# Patient Record
Sex: MALE | Race: WHITE | NOT HISPANIC OR LATINO | Employment: FULL TIME | URBAN - METROPOLITAN AREA
[De-identification: names, ages, dates, MRNs, and addresses within clinical notes are randomized per-mention and may not be internally consistent; named-entity substitution may affect disease eponyms.]

---

## 2024-04-09 ENCOUNTER — APPOINTMENT (OUTPATIENT)
Dept: RADIOLOGY | Facility: HOSPITAL | Age: 34
End: 2024-04-09
Payer: COMMERCIAL

## 2024-04-09 ENCOUNTER — HOSPITAL ENCOUNTER (EMERGENCY)
Facility: HOSPITAL | Age: 34
Discharge: HOME | End: 2024-04-09
Attending: EMERGENCY MEDICINE
Payer: COMMERCIAL

## 2024-04-09 ENCOUNTER — APPOINTMENT (OUTPATIENT)
Dept: CARDIOLOGY | Facility: HOSPITAL | Age: 34
End: 2024-04-09
Payer: COMMERCIAL

## 2024-04-09 VITALS
DIASTOLIC BLOOD PRESSURE: 71 MMHG | SYSTOLIC BLOOD PRESSURE: 138 MMHG | OXYGEN SATURATION: 98 % | TEMPERATURE: 97.5 F | WEIGHT: 300 LBS | RESPIRATION RATE: 20 BRPM | HEART RATE: 101 BPM

## 2024-04-09 DIAGNOSIS — R00.2 PALPITATIONS: Primary | ICD-10-CM

## 2024-04-09 LAB
ALBUMIN SERPL BCP-MCNC: 4.5 G/DL (ref 3.4–5)
ALP SERPL-CCNC: 95 U/L (ref 33–120)
ALT SERPL W P-5'-P-CCNC: 67 U/L (ref 10–52)
ANION GAP SERPL CALC-SCNC: 18 MMOL/L (ref 10–20)
AST SERPL W P-5'-P-CCNC: 26 U/L (ref 9–39)
BASOPHILS # BLD AUTO: 0.04 X10*3/UL (ref 0–0.1)
BASOPHILS NFR BLD AUTO: 0.5 %
BILIRUB SERPL-MCNC: 0.4 MG/DL (ref 0–1.2)
BUN SERPL-MCNC: 6 MG/DL (ref 6–23)
CALCIUM SERPL-MCNC: 9.2 MG/DL (ref 8.6–10.3)
CARDIAC TROPONIN I PNL SERPL HS: 3 NG/L (ref 0–20)
CARDIAC TROPONIN I PNL SERPL HS: 3 NG/L (ref 0–20)
CHLORIDE SERPL-SCNC: 101 MMOL/L (ref 98–107)
CO2 SERPL-SCNC: 22 MMOL/L (ref 21–32)
CREAT SERPL-MCNC: 1.03 MG/DL (ref 0.5–1.3)
EGFRCR SERPLBLD CKD-EPI 2021: >90 ML/MIN/1.73M*2
EOSINOPHIL # BLD AUTO: 0.06 X10*3/UL (ref 0–0.7)
EOSINOPHIL NFR BLD AUTO: 0.8 %
ERYTHROCYTE [DISTWIDTH] IN BLOOD BY AUTOMATED COUNT: 12.8 % (ref 11.5–14.5)
FLUAV RNA RESP QL NAA+PROBE: NOT DETECTED
FLUBV RNA RESP QL NAA+PROBE: NOT DETECTED
GLUCOSE SERPL-MCNC: 118 MG/DL (ref 74–99)
HCT VFR BLD AUTO: 45.4 % (ref 41–52)
HGB BLD-MCNC: 15.6 G/DL (ref 13.5–17.5)
IMM GRANULOCYTES # BLD AUTO: 0.03 X10*3/UL (ref 0–0.7)
IMM GRANULOCYTES NFR BLD AUTO: 0.4 % (ref 0–0.9)
LYMPHOCYTES # BLD AUTO: 1.31 X10*3/UL (ref 1.2–4.8)
LYMPHOCYTES NFR BLD AUTO: 17.4 %
MCH RBC QN AUTO: 27.9 PG (ref 26–34)
MCHC RBC AUTO-ENTMCNC: 34.4 G/DL (ref 32–36)
MCV RBC AUTO: 81 FL (ref 80–100)
MONOCYTES # BLD AUTO: 0.52 X10*3/UL (ref 0.1–1)
MONOCYTES NFR BLD AUTO: 6.9 %
NEUTROPHILS # BLD AUTO: 5.59 X10*3/UL (ref 1.2–7.7)
NEUTROPHILS NFR BLD AUTO: 74 %
NRBC BLD-RTO: 0 /100 WBCS (ref 0–0)
PLATELET # BLD AUTO: 418 X10*3/UL (ref 150–450)
POTASSIUM SERPL-SCNC: 4 MMOL/L (ref 3.5–5.3)
PROT SERPL-MCNC: 7.4 G/DL (ref 6.4–8.2)
RBC # BLD AUTO: 5.59 X10*6/UL (ref 4.5–5.9)
SODIUM SERPL-SCNC: 137 MMOL/L (ref 136–145)
WBC # BLD AUTO: 7.6 X10*3/UL (ref 4.4–11.3)

## 2024-04-09 PROCEDURE — 87502 INFLUENZA DNA AMP PROBE: CPT | Performed by: EMERGENCY MEDICINE

## 2024-04-09 PROCEDURE — 99285 EMERGENCY DEPT VISIT HI MDM: CPT | Mod: 25

## 2024-04-09 PROCEDURE — 36415 COLL VENOUS BLD VENIPUNCTURE: CPT | Performed by: EMERGENCY MEDICINE

## 2024-04-09 PROCEDURE — 84484 ASSAY OF TROPONIN QUANT: CPT | Performed by: EMERGENCY MEDICINE

## 2024-04-09 PROCEDURE — 71275 CT ANGIOGRAPHY CHEST: CPT | Performed by: RADIOLOGY

## 2024-04-09 PROCEDURE — 80053 COMPREHEN METABOLIC PANEL: CPT | Performed by: EMERGENCY MEDICINE

## 2024-04-09 PROCEDURE — 2500000004 HC RX 250 GENERAL PHARMACY W/ HCPCS (ALT 636 FOR OP/ED): Performed by: EMERGENCY MEDICINE

## 2024-04-09 PROCEDURE — 2550000001 HC RX 255 CONTRASTS: Performed by: EMERGENCY MEDICINE

## 2024-04-09 PROCEDURE — 85025 COMPLETE CBC W/AUTO DIFF WBC: CPT | Performed by: EMERGENCY MEDICINE

## 2024-04-09 PROCEDURE — 93005 ELECTROCARDIOGRAM TRACING: CPT

## 2024-04-09 PROCEDURE — 71275 CT ANGIOGRAPHY CHEST: CPT

## 2024-04-09 PROCEDURE — 71045 X-RAY EXAM CHEST 1 VIEW: CPT | Performed by: RADIOLOGY

## 2024-04-09 PROCEDURE — 71045 X-RAY EXAM CHEST 1 VIEW: CPT

## 2024-04-09 RX ADMIN — IOHEXOL 75 ML: 350 INJECTION, SOLUTION INTRAVENOUS at 13:56

## 2024-04-09 RX ADMIN — SODIUM CHLORIDE 1000 ML: 9 INJECTION, SOLUTION INTRAVENOUS at 11:22

## 2024-04-09 ASSESSMENT — COLUMBIA-SUICIDE SEVERITY RATING SCALE - C-SSRS
2. HAVE YOU ACTUALLY HAD ANY THOUGHTS OF KILLING YOURSELF?: NO
1. IN THE PAST MONTH, HAVE YOU WISHED YOU WERE DEAD OR WISHED YOU COULD GO TO SLEEP AND NOT WAKE UP?: NO
6. HAVE YOU EVER DONE ANYTHING, STARTED TO DO ANYTHING, OR PREPARED TO DO ANYTHING TO END YOUR LIFE?: NO

## 2024-04-09 NOTE — ED PROVIDER NOTES
HPI   Chief Complaint   Patient presents with    Chest Pain     Patient c/o chest tightness and heart racing since 5am, patient appears very anxious bouncing legs and moving around in chair. No other complaints endorsed at this time       33-year-old male with a history of MARLEY and hypertension as well as smokeless tobacco use presents with palpitations.  They started after he woke up.  Around 5 AM.  He had something similar in October last year no conclusive cause was found.  He is never seen a cardiologist.  Never had a stress test.  No family history of cardiac disease at young age.  No recent travel or immobilization.  No lower extremity pain or swelling.  No actual chest pain, just palpitations.  No exertional component.  No shortness of breath.  No orthopnea.  He does vape.  He states that he did drink a fair amount of alcohol yesterday during the [place] but otherwise no unusual food intake or caffeine intake.  No drug use.                          No data recorded                   Patient History   No past medical history on file.  Past Surgical History:   Procedure Laterality Date    OTHER SURGICAL HISTORY  03/23/2016    Skin Tag Removal     No family history on file.  Social History     Tobacco Use    Smoking status: Not on file    Smokeless tobacco: Not on file   Substance Use Topics    Alcohol use: Not on file    Drug use: Not on file       Physical Exam   ED Triage Vitals   Temperature Heart Rate Respirations BP   04/09/24 1029 04/09/24 1029 04/09/24 1032 04/09/24 1029   36.1 °C (97 °F) (!) 120 (!) 22 124/80      Pulse Ox Temp Source Heart Rate Source Patient Position   04/09/24 1032 04/09/24 1029 04/09/24 1029 04/09/24 1029   95 % Temporal Monitor Sitting      BP Location FiO2 (%)     04/09/24 1029 --     Left arm        Physical Exam  Vitals and nursing note reviewed.   Constitutional:       General: He is not in acute distress.     Appearance: He is well-developed.   HENT:      Head: Normocephalic  and atraumatic.   Eyes:      Conjunctiva/sclera: Conjunctivae normal.   Cardiovascular:      Rate and Rhythm: Normal rate and regular rhythm.      Heart sounds: No murmur heard.  Pulmonary:      Effort: Pulmonary effort is normal. No respiratory distress.      Breath sounds: Normal breath sounds.   Abdominal:      Palpations: Abdomen is soft.      Tenderness: There is no abdominal tenderness.   Musculoskeletal:         General: No swelling.      Cervical back: Neck supple.   Skin:     General: Skin is warm and dry.      Capillary Refill: Capillary refill takes less than 2 seconds.   Neurological:      Mental Status: He is alert.   Psychiatric:         Mood and Affect: Mood normal.         ED Course & MDM   ED Course as of 04/10/24 1156   Tue Apr 09, 2024   1313 Patient is pain-free now.  Troponin negative.  Ordering CT chest to rule out PE.  Flu negative.  Labs within normal limits. [CD]      ED Course User Index  [CD] Mehdi Lai MD         Diagnoses as of 04/10/24 1156   Palpitations       Medical Decision Making  EKG interpreted by me.  Sinus rhythm.  Rate 125.  Sinus tachycardia.  No acute ischemic changes.  Normal QRS duration.  No ST elevation.    CT angiogram chest negative for PE.  2 negative troponins.  Overall very low risk profile.  Extensive discussion with him regarding the importance of close follow-up with cardiology for possible outpatient Holter monitor.  Return precautions explained at length.          Procedure  Procedures     Mehdi Lai MD  04/10/24 1154

## 2024-04-09 NOTE — ED TRIAGE NOTES
Patient c/o chest tightness and heart racing since 5am, patient appears very anxious bouncing legs and moving around in chair. No other complaints endorsed at this time

## 2024-04-10 LAB
ATRIAL RATE: 126 BPM
P AXIS: 35 DEGREES
PR INTERVAL: 136 MS
Q ONSET: 249 MS
QRS COUNT: 20 BEATS
QRS DURATION: 81 MS
QT INTERVAL: 335 MS
QTC CALCULATION(BAZETT): 484 MS
QTC FREDERICIA: 427 MS
R AXIS: 23 DEGREES
T AXIS: 8 DEGREES
T OFFSET: 417 MS
VENTRICULAR RATE: 125 BPM

## 2024-05-02 ENCOUNTER — OFFICE VISIT (OUTPATIENT)
Dept: CARDIOLOGY | Facility: CLINIC | Age: 34
End: 2024-05-02
Payer: COMMERCIAL

## 2024-05-02 ENCOUNTER — HOSPITAL ENCOUNTER (OUTPATIENT)
Dept: CARDIOLOGY | Facility: CLINIC | Age: 34
Discharge: HOME | End: 2024-05-02
Payer: COMMERCIAL

## 2024-05-02 VITALS
SYSTOLIC BLOOD PRESSURE: 110 MMHG | HEART RATE: 86 BPM | HEIGHT: 69 IN | OXYGEN SATURATION: 96 % | BODY MASS INDEX: 44.88 KG/M2 | WEIGHT: 303 LBS | DIASTOLIC BLOOD PRESSURE: 80 MMHG

## 2024-05-02 DIAGNOSIS — R00.2 PALPITATIONS: ICD-10-CM

## 2024-05-02 DIAGNOSIS — R06.09 DYSPNEA ON EXERTION: ICD-10-CM

## 2024-05-02 DIAGNOSIS — G47.33 OSA (OBSTRUCTIVE SLEEP APNEA): ICD-10-CM

## 2024-05-02 DIAGNOSIS — E66.01 MORBID OBESITY (MULTI): ICD-10-CM

## 2024-05-02 DIAGNOSIS — I10 PRIMARY HYPERTENSION: ICD-10-CM

## 2024-05-02 DIAGNOSIS — R00.2 PALPITATIONS: Primary | ICD-10-CM

## 2024-05-02 PROCEDURE — 93227 XTRNL ECG REC<48 HR R&I: CPT | Performed by: INTERNAL MEDICINE

## 2024-05-02 PROCEDURE — 99203 OFFICE O/P NEW LOW 30 MIN: CPT | Performed by: INTERNAL MEDICINE

## 2024-05-02 PROCEDURE — 3079F DIAST BP 80-89 MM HG: CPT | Performed by: INTERNAL MEDICINE

## 2024-05-02 PROCEDURE — 3074F SYST BP LT 130 MM HG: CPT | Performed by: INTERNAL MEDICINE

## 2024-05-02 PROCEDURE — 93225 XTRNL ECG REC<48 HRS REC: CPT

## 2024-05-02 PROCEDURE — 93000 ELECTROCARDIOGRAM COMPLETE: CPT | Performed by: INTERNAL MEDICINE

## 2024-05-02 RX ORDER — LISINOPRIL 20 MG/1
1 TABLET ORAL DAILY
COMMUNITY
Start: 2024-02-05

## 2024-05-02 NOTE — PROGRESS NOTES
"  Subjective  Brijesh Ruiz  is a 33 y.o. year old male who presents for palpitations seen [facility] 4/9/24 by Ming Cash.  Uses smokeless tobacco.  Usually after he has been out drinking the night before  No chest pain, notes some exertional dyspnea, no edema    Blood pressure 110/80, pulse 86, height 1.753 m (5' 9\"), weight 137 kg (303 lb), SpO2 96%.   Patient has no known allergies.  History reviewed. No pertinent past medical history.  Past Surgical History:   Procedure Laterality Date    OTHER SURGICAL HISTORY  03/23/2016    Skin Tag Removal     No family history on file.  @SOC    Current Outpatient Medications   Medication Sig Dispense Refill    lisinopril 20 mg tablet Take 1 tablet (20 mg) by mouth once daily.       No current facility-administered medications for this visit.        ROS  Review of Systems   All other systems reviewed and are negative.      Physical Exam  Physical Exam  Constitutional:       Appearance: He is obese.   Cardiovascular:      Rate and Rhythm: Normal rate and regular rhythm.      Heart sounds: S1 normal and S2 normal.   Pulmonary:      Effort: Pulmonary effort is normal.      Breath sounds: Normal breath sounds.   Abdominal:      Palpations: Abdomen is soft.   Musculoskeletal:      Right lower leg: No edema.      Left lower leg: No edema.   Skin:     General: Skin is warm and dry.   Neurological:      General: No focal deficit present.      Mental Status: He is alert and oriented to person, place, and time.   Psychiatric:         Mood and Affect: Mood normal.         Behavior: Behavior normal.          EKG  Encounter Date: 05/02/24   ECG 12 Lead    Narrative    NSR at 92/min., ST-T abn.       Problem List Items Addressed This Visit       Dyspnea on exertion    Relevant Orders    Holter or Event Cardiac Monitor    Transthoracic Echo (TTE) Complete    Palpitations - Primary    Relevant Orders    Holter or Event Cardiac Monitor    Transthoracic Echo (TTE) Complete    Primary " hypertension    Relevant Orders    ECG 12 Lead (Completed)    Holter or Event Cardiac Monitor    MARLEY (obstructive sleep apnea)    Relevant Orders    Holter or Event Cardiac Monitor    Morbid obesity (Multi)         48 Hour holter, echocardiogram with office visit 2 weeks after the completion of the Holter      Franco Hendrickson MD

## 2024-05-09 LAB — BODY SURFACE AREA: 2.59 M2

## 2024-05-15 PROBLEM — F40.10 SOCIAL ANXIETY DISORDER: Status: ACTIVE | Noted: 2018-09-24

## 2024-05-15 PROBLEM — F17.200 SMOKER: Status: ACTIVE | Noted: 2020-10-15

## 2024-05-29 ENCOUNTER — HOSPITAL ENCOUNTER (OUTPATIENT)
Dept: CARDIOLOGY | Facility: CLINIC | Age: 34
Discharge: HOME | End: 2024-05-29
Payer: COMMERCIAL

## 2024-05-29 DIAGNOSIS — R06.09 DYSPNEA ON EXERTION: ICD-10-CM

## 2024-05-29 DIAGNOSIS — R00.2 PALPITATIONS: ICD-10-CM

## 2024-05-29 LAB
EJECTION FRACTION APICAL 4 CHAMBER: 70.8
LEFT VENTRICLE INTERNAL DIMENSION DIASTOLE: 5.23 CM (ref 3.5–6)
LEFT VENTRICULAR OUTFLOW TRACT DIAMETER: 2.25 CM
LV EJECTION FRACTION BIPLANE: 59 %
RIGHT VENTRICLE FREE WALL PEAK S': 0.11 CM/S
RIGHT VENTRICLE PEAK SYSTOLIC PRESSURE: 26.9 MMHG
TRICUSPID ANNULAR PLANE SYSTOLIC EXCURSION: 1.9 CM

## 2024-05-29 PROCEDURE — 93306 TTE W/DOPPLER COMPLETE: CPT

## 2024-05-29 PROCEDURE — 93306 TTE W/DOPPLER COMPLETE: CPT | Performed by: INTERNAL MEDICINE

## 2024-06-03 ENCOUNTER — OFFICE VISIT (OUTPATIENT)
Dept: CARDIOLOGY | Facility: CLINIC | Age: 34
End: 2024-06-03
Payer: COMMERCIAL

## 2024-06-03 VITALS
HEIGHT: 69 IN | SYSTOLIC BLOOD PRESSURE: 110 MMHG | BODY MASS INDEX: 44.43 KG/M2 | HEART RATE: 97 BPM | OXYGEN SATURATION: 98 % | DIASTOLIC BLOOD PRESSURE: 80 MMHG | WEIGHT: 300 LBS

## 2024-06-03 DIAGNOSIS — E66.01 MORBID OBESITY (MULTI): ICD-10-CM

## 2024-06-03 DIAGNOSIS — R06.09 DYSPNEA ON EXERTION: ICD-10-CM

## 2024-06-03 DIAGNOSIS — G47.33 OSA (OBSTRUCTIVE SLEEP APNEA): ICD-10-CM

## 2024-06-03 DIAGNOSIS — R00.2 PALPITATIONS: Primary | ICD-10-CM

## 2024-06-03 DIAGNOSIS — I10 PRIMARY HYPERTENSION: ICD-10-CM

## 2024-06-03 PROCEDURE — 3074F SYST BP LT 130 MM HG: CPT | Performed by: INTERNAL MEDICINE

## 2024-06-03 PROCEDURE — 99213 OFFICE O/P EST LOW 20 MIN: CPT | Performed by: INTERNAL MEDICINE

## 2024-06-03 PROCEDURE — 3079F DIAST BP 80-89 MM HG: CPT | Performed by: INTERNAL MEDICINE

## 2024-06-03 NOTE — ASSESSMENT & PLAN NOTE
5/2-4/24 Holter sinus between 51- 142, avg HR = 89. No afib, no{SVT, no high grade AV block, no VT (Ramicone).  No sucgnifican arrhythmias seen.  Palpiations secondary to increased ethanol ingestion

## 2024-06-03 NOTE — PROGRESS NOTES
"  Subjective  Brijesh Ruiz  is a 34 y.o. year old male who presents for F/U Holter and echocardiogram.    Blood pressure 110/80, pulse 97, height 1.753 m (5' 9\"), weight 136 kg (300 lb), SpO2 98%.   Patient has no known allergies.  History reviewed. No pertinent past medical history.  Past Surgical History:   Procedure Laterality Date   • OTHER SURGICAL HISTORY  03/23/2016    Skin Tag Removal     No family history on file.  @SOC    Current Outpatient Medications   Medication Sig Dispense Refill   • lisinopril 20 mg tablet Take 1 tablet (20 mg) by mouth once daily.       No current facility-administered medications for this visit.        ROS  Review of Systems    Physical Exam  Physical Exam  Constitutional:       Appearance: He is obese.   HENT:      Head: Normocephalic and atraumatic.   Cardiovascular:      Heart sounds: S1 normal and S2 normal.   Pulmonary:      Effort: Pulmonary effort is normal.   Abdominal:      Palpations: Abdomen is soft.   Musculoskeletal:      Right lower leg: No edema.      Left lower leg: No edema.   Skin:     General: Skin is warm and dry.   Neurological:      General: No focal deficit present.      Mental Status: He is alert and oriented to person, place, and time.   Psychiatric:         Mood and Affect: Mood normal.         Behavior: Behavior normal.        EKG  Encounter Date: 05/02/24   ECG 12 Lead    Narrative    NSR at 92/min., ST-T abn.       Problem List Items Addressed This Visit       Dyspnea on exertion     5/29/24 echocardiogram LVEF = 55-60%, TR with normal RVSP, unremarkable valves         Palpitations - Primary     5/2-4/24 Holter sinus between 51- 142, avg HR = 89. No afib, no{SVT, no high grade AV block, no VT (Ramicone)         Primary hypertension    MARLEY (obstructive sleep apnea)    Morbid obesity (Multi)         F/U with PCP      Franco Hendrickson MD   "

## 2024-11-24 ENCOUNTER — ANCILLARY PROCEDURE (OUTPATIENT)
Dept: URGENT CARE | Age: 34
End: 2024-11-24
Payer: COMMERCIAL

## 2024-11-24 ENCOUNTER — OFFICE VISIT (OUTPATIENT)
Dept: URGENT CARE | Age: 34
End: 2024-11-24
Payer: COMMERCIAL

## 2024-11-24 VITALS
HEIGHT: 69 IN | BODY MASS INDEX: 44.43 KG/M2 | TEMPERATURE: 99 F | HEART RATE: 107 BPM | DIASTOLIC BLOOD PRESSURE: 76 MMHG | OXYGEN SATURATION: 94 % | WEIGHT: 300 LBS | SYSTOLIC BLOOD PRESSURE: 117 MMHG | RESPIRATION RATE: 18 BRPM

## 2024-11-24 DIAGNOSIS — R06.02 SHORTNESS OF BREATH: ICD-10-CM

## 2024-11-24 DIAGNOSIS — R06.02 SHORTNESS OF BREATH: Primary | ICD-10-CM

## 2024-11-24 DIAGNOSIS — J40 BRONCHITIS: ICD-10-CM

## 2024-11-24 PROCEDURE — 3008F BODY MASS INDEX DOCD: CPT | Performed by: PHYSICIAN ASSISTANT

## 2024-11-24 PROCEDURE — 3074F SYST BP LT 130 MM HG: CPT | Performed by: PHYSICIAN ASSISTANT

## 2024-11-24 PROCEDURE — 3078F DIAST BP <80 MM HG: CPT | Performed by: PHYSICIAN ASSISTANT

## 2024-11-24 PROCEDURE — 99204 OFFICE O/P NEW MOD 45 MIN: CPT | Performed by: PHYSICIAN ASSISTANT

## 2024-11-24 PROCEDURE — 71046 X-RAY EXAM CHEST 2 VIEWS: CPT | Performed by: PHYSICIAN ASSISTANT

## 2024-11-24 RX ORDER — METHYLPREDNISOLONE 4 MG/1
TABLET ORAL
Qty: 21 TABLET | Refills: 0 | Status: SHIPPED | OUTPATIENT
Start: 2024-11-24 | End: 2024-12-01

## 2024-11-24 RX ORDER — BENZONATATE 200 MG/1
200 CAPSULE ORAL 3 TIMES DAILY PRN
Qty: 21 CAPSULE | Refills: 0 | Status: SHIPPED | OUTPATIENT
Start: 2024-11-24 | End: 2024-12-01

## 2024-11-24 ASSESSMENT — ENCOUNTER SYMPTOMS
WHEEZING: 1
FEVER: 1
DIARRHEA: 1
COUGH: 1

## 2024-11-24 NOTE — PROGRESS NOTES
"Subjective   Patient ID: Brijesh Ruiz is a 34 y.o. male. They present today with a chief complaint of Fever, Cough, and Wheezing (Shortness of breath ).    History of Present Illness    34-year-old patient presents to clinic with new onset of fevers, productive cough, shortness of breath with walking, chest congestion, wheezing, sweats ongoing for the past 5 days after bilateral ear infection which is being treated with Augmentin currently.    Fever   This is a new problem. The current episode started in the past 7 days. The problem occurs daily. The problem has been waxing and waning. The maximum temperature noted was 101 to 101.9 F. The temperature was taken using an oral thermometer. Associated symptoms include coughing, diarrhea, ear pain and wheezing.   Risk factors: hx of cancer and recent sickness    Risk factors: no contaminated food, no contaminated water, no immunosuppression, no occupational exposure, no recent travel and no sick contacts        Past Medical History  Allergies as of 11/24/2024    (No Known Allergies)       (Not in a hospital admission)       No past medical history on file.    Past Surgical History:   Procedure Laterality Date    OTHER SURGICAL HISTORY  03/23/2016    Skin Tag Removal        reports that he has never smoked. He uses smokeless tobacco. He reports current alcohol use.    Review of Systems  Review of Systems   Constitutional:  Positive for fever.   HENT:  Positive for ear pain.    Respiratory:  Positive for cough and wheezing.    Gastrointestinal:  Positive for diarrhea.                                  Objective    Vitals:    11/24/24 1754   BP: 117/76   BP Location: Left arm   Pulse: 107   Resp: 18   Temp: 37.2 °C (99 °F)   SpO2: 94%   Weight: 136 kg (300 lb)   Height: 1.753 m (5' 9\")     No LMP for male patient.    Physical Exam  Constitutional:       Appearance: Normal appearance.   HENT:      Head: Normocephalic and atraumatic.      Right Ear: Tympanic membrane, ear " canal and external ear normal.      Left Ear: Tympanic membrane, ear canal and external ear normal.      Nose: Mucosal edema present. No rhinorrhea.      Right Sinus: No maxillary sinus tenderness or frontal sinus tenderness.      Left Sinus: No maxillary sinus tenderness or frontal sinus tenderness.      Mouth/Throat:      Lips: Pink.      Mouth: Mucous membranes are moist. No oral lesions.      Dentition: Normal dentition. No gingival swelling.      Tongue: No lesions. Tongue does not deviate from midline.      Palate: No mass and lesions.      Pharynx: Postnasal drip present. No pharyngeal swelling, posterior oropharyngeal erythema or uvula swelling.   Cardiovascular:      Rate and Rhythm: Normal rate and regular rhythm.      Heart sounds: No murmur heard.  Pulmonary:      Effort: Pulmonary effort is normal. No respiratory distress.      Breath sounds: No stridor. Wheezing present. No rhonchi or rales.   Lymphadenopathy:      Cervical: Cervical adenopathy present.   Neurological:      Mental Status: He is alert.         Procedures    Point of Care Test & Imaging Results from this visit  No results found for this visit on 11/24/24.   XR chest 2 views    Result Date: 11/24/2024  Interpreted By:  Carey Caldwell, STUDY: XR CHEST 2 VIEWS; 11/24/2024 6:09 pm   INDICATION: Signs/Symptoms:shortness of breath with new onset of fever   COMPARISON: Radiographs 04/09/2024   ACCESSION NUMBER(S): [id number]   ORDERING CLINICIAN: PAUL FABINA   TECHNIQUE: Frontal and lateral radiographs of the chest were obtained.   FINDINGS: LINES AND DEVICES: None.   LUNGS: No focal consolidation, pulmonary edema, pleural effusion or pneumothorax.   CARDIOMEDIASTINAL SILHOUETTE: The cardiomediastinal silhouette is within normal limits.   OTHER: No acute osseous abnormality.       No focal consolidation.   MACRO None   Signed by: Carey Caldwell 11/24/2024 6:39 PM Dictation workstation:   [id number]     Diagnostic study results (if  any) were reviewed by Mary Bullock PA-C.    Assessment/Plan   Allergies, medications, history, and pertinent labs/EKGs/Imaging reviewed by Mary Bullock PA-C.   new onset of fevers, productive cough, shortness of breath with walking, chest congestion, wheezing, sweats ongoing for the past 5 days after bilateral ear infection which is being treated with Augmentin currently.   Medrol Dosepak started.  Tessalon Perles started. Advised to drink plenty of fluids, run a cool-mist humidifier in room at night, and get plenty of rest. Pt. is advised to take 10 deep breaths and hours and continue to walk around every couple of hours. Patient should avoid over-exertion and reduce exposure to irritants such as smoke, cold, dry air, and dust. Patient may take acetaminophen as directed to reduce fever and body aches. Risk, benefits, and potential side effects of medication(s) discussed with pt. Discussed disease/illness presentation, treatment options, progression, complications, and outcomes with patient. Pt. Has expressed understanding and is an agreement of plan of care.       Medical Decision Making      Orders and Diagnoses  Diagnoses and all orders for this visit:  Shortness of breath  -     XR chest 2 views; Future  Bronchitis  -     methylPREDNISolone (Medrol Dospak) 4 mg tablets; Follow schedule on package instructions  -     benzonatate (Tessalon) 200 mg capsule; Take 1 capsule (200 mg) by mouth 3 times a day as needed for cough for up to 7 days. Do not crush or chew.      Medical Admin Record      Patient disposition: Home    Electronically signed by Mary Bullock PA-C  7:00 PM

## 2024-11-25 NOTE — PATIENT INSTRUCTIONS
Warm liquids with honey  Increase fluid intake; encourage electrolytes such as Pedialyte  10 deep breaths an hour  May use tylenol as needed for fevers, chills, body aches.

## 2024-12-02 ENCOUNTER — OFFICE VISIT (OUTPATIENT)
Dept: URGENT CARE | Age: 34
End: 2024-12-02
Payer: COMMERCIAL

## 2024-12-02 VITALS
SYSTOLIC BLOOD PRESSURE: 138 MMHG | RESPIRATION RATE: 18 BRPM | WEIGHT: 295 LBS | OXYGEN SATURATION: 92 % | DIASTOLIC BLOOD PRESSURE: 89 MMHG | HEIGHT: 69 IN | BODY MASS INDEX: 43.69 KG/M2 | TEMPERATURE: 98.6 F | HEART RATE: 84 BPM

## 2024-12-02 DIAGNOSIS — J40 BRONCHITIS: Primary | ICD-10-CM

## 2024-12-02 RX ORDER — BENZONATATE 200 MG/1
200 CAPSULE ORAL 3 TIMES DAILY PRN
Qty: 21 CAPSULE | Refills: 0 | Status: SHIPPED | OUTPATIENT
Start: 2024-12-02 | End: 2024-12-09

## 2024-12-02 RX ORDER — PREDNISONE 20 MG/1
20 TABLET ORAL 2 TIMES DAILY
Qty: 10 TABLET | Refills: 0 | Status: SHIPPED | OUTPATIENT
Start: 2024-12-02 | End: 2024-12-07

## 2024-12-02 NOTE — PROGRESS NOTES
"Subjective   Patient ID: Brijesh Ruiz is a 34 y.o. male. They present today with a chief complaint of Cough (X several weeks, SOB).    History of Present Illness   34-year-old patient presents to clinic with complaints of ongoing harsh cough with associated shortness of breath ongoing for the past 2 weeks which was initially treated with Augmentin without relief and then treated with Medrol Dosepak and Tessalon Perles on the 24th with some improvement the first 2 to 3 days of the steroids but over the past couple of days cough and shortness of breath has worsened  With oxygen levels ranging from 88% to 92%.  On the 24th chest x-ray was done in clinic which was negative.  Reports on first 2 to 3 days of oral steroids was ranging between 92 to 94% oxygen levels.  Reports used albuterol which caused tachycardia and chest pressure and therefore has not used it since then.  Reports the tachycardia was severe and is not comfortable using albuterol again.  Reports Tessalon Perles has helped the cough.  Reports does vape daily,  However has not vape during current illness. Denies chest pain, dizziness, fevers, chills, body aches, nausea, vomiting, abdominal pain, diarrhea.       Past Medical History  Allergies as of 12/02/2024 - Reviewed 12/02/2024   Allergen Reaction Noted    Losartan Other 10/16/2023       (Not in a hospital admission)       No past medical history on file.    Past Surgical History:   Procedure Laterality Date    OTHER SURGICAL HISTORY  03/23/2016    Skin Tag Removal        reports that he has never smoked. He uses smokeless tobacco. He reports current alcohol use.    Review of Systems  ROS negative with the exception as noted on HPI                                 Objective    Vitals:    12/02/24 1232   BP: 138/89   BP Location: Left arm   Pulse: 84   Resp: 18   Temp: 37 °C (98.6 °F)   SpO2: 92%   Weight: 134 kg (295 lb)   Height: 1.753 m (5' 9\")     No LMP for male patient.    Physical " Exam  Constitutional:       Appearance: Normal appearance.   HENT:      Head: Normocephalic and atraumatic.      Right Ear: Tympanic membrane, ear canal and external ear normal.      Left Ear: Tympanic membrane, ear canal and external ear normal.      Nose: Mucosal edema present. No rhinorrhea.      Right Sinus: No maxillary sinus tenderness or frontal sinus tenderness.      Left Sinus: No maxillary sinus tenderness or frontal sinus tenderness.      Mouth/Throat:      Lips: Pink.      Mouth: Mucous membranes are moist. No oral lesions.      Dentition: Normal dentition. No gingival swelling.      Tongue: No lesions. Tongue does not deviate from midline.      Palate: No mass and lesions.      Pharynx: Postnasal drip present. No pharyngeal swelling, posterior oropharyngeal erythema or uvula swelling.   Cardiovascular:      Rate and Rhythm: Normal rate and regular rhythm.      Heart sounds: No murmur heard.  Pulmonary:      Effort: Pulmonary effort is normal. No respiratory distress.      Breath sounds: No stridor. Wheezing and rhonchi present. No rales.   Lymphadenopathy:      Cervical: No cervical adenopathy.   Neurological:      Mental Status: He is alert.         Procedures    Point of Care Test & Imaging Results from this visit  No results found for this visit on 12/02/24.   No results found.    Diagnostic study results (if any) were reviewed by Mary Bullock PA-C.    Assessment/Plan   Allergies, medications, history, and pertinent labs/EKGs/Imaging reviewed by Mary Bullock PA-C.   ongoing harsh cough with associated shortness of breath ongoing for the past 2 weeks which was initially treated with Augmentin without relief and then treated with Medrol Dosepak and Tessalon Perles on the 24th with some improvement the first 2 to 3 days of the steroids but over the past couple of days cough and shortness of breath has worsened  With oxygen levels ranging from 88% to 92%.  Oxygen levels at 92%.  Discussed  potentially doing nebulizer treatment with albuterol which patient declined due to side effects.  Patient on exam is not visibly dyspneic and there is no conversational dyspnea.  Case was discussed with supervising physician who recommended discharge patient home on prednisone 20 mg twice daily x 5 days.   Discussed with patient should patient continue to drop below 92% patient should proceed to the ED immediately.  Thoroughly discussed with patient follow-up with PCP.  Tessalon Perles started for cough.  Discussed with patient if there is new onset of dizziness, worsening shortness of breath, continued hypoxia below 92%, dyspnea, chest pain, palpitations, presyncope, syncope, new onset of fevers/ chills patient should proceed to the ED immediately. Risk, benefits, and potential side effects of medication(s) discussed with pt. Discussed disease/illness presentation, treatment options, progression, complications, and outcomes with patient. Pt. Has expressed understanding and is an agreement of plan of care.    Medical Decision Making      Orders and Diagnoses  Diagnoses and all orders for this visit:  Bronchitis  -     predniSONE (Deltasone) 20 mg tablet; Take 1 tablet (20 mg) by mouth 2 times a day for 5 days.  -     benzonatate (Tessalon) 200 mg capsule; Take 1 capsule (200 mg) by mouth 3 times a day as needed for cough for up to 7 days. Do not crush or chew.      Medical Admin Record      Patient disposition: Home    Electronically signed by Mary Bullock PA-C  1:53 PM

## 2025-05-23 ENCOUNTER — HOSPITAL ENCOUNTER (EMERGENCY)
Facility: HOSPITAL | Age: 35
Discharge: HOME | End: 2025-05-23
Payer: COMMERCIAL

## 2025-05-23 ENCOUNTER — APPOINTMENT (OUTPATIENT)
Dept: RADIOLOGY | Facility: HOSPITAL | Age: 35
End: 2025-05-23
Payer: COMMERCIAL

## 2025-05-23 VITALS
TEMPERATURE: 100.8 F | OXYGEN SATURATION: 99 % | HEIGHT: 69 IN | BODY MASS INDEX: 41.47 KG/M2 | RESPIRATION RATE: 16 BRPM | SYSTOLIC BLOOD PRESSURE: 146 MMHG | HEART RATE: 109 BPM | DIASTOLIC BLOOD PRESSURE: 89 MMHG | WEIGHT: 280 LBS

## 2025-05-23 DIAGNOSIS — R50.9 FEVER, UNSPECIFIED FEVER CAUSE: ICD-10-CM

## 2025-05-23 DIAGNOSIS — K62.89 RECTAL PAIN: Primary | ICD-10-CM

## 2025-05-23 LAB
ALBUMIN SERPL BCP-MCNC: 4.3 G/DL (ref 3.4–5)
ALP SERPL-CCNC: 77 U/L (ref 33–120)
ALT SERPL W P-5'-P-CCNC: 38 U/L (ref 10–52)
ANION GAP SERPL CALC-SCNC: 12 MMOL/L (ref 10–20)
APPEARANCE UR: CLEAR
AST SERPL W P-5'-P-CCNC: 20 U/L (ref 9–39)
BASOPHILS # BLD AUTO: 0.01 X10*3/UL (ref 0–0.1)
BASOPHILS NFR BLD AUTO: 0.2 %
BILIRUB SERPL-MCNC: 0.3 MG/DL (ref 0–1.2)
BILIRUB UR STRIP.AUTO-MCNC: NEGATIVE MG/DL
BUN SERPL-MCNC: 12 MG/DL (ref 6–23)
CALCIUM SERPL-MCNC: 8.8 MG/DL (ref 8.6–10.3)
CHLORIDE SERPL-SCNC: 105 MMOL/L (ref 98–107)
CO2 SERPL-SCNC: 24 MMOL/L (ref 21–32)
COLOR UR: ABNORMAL
CREAT SERPL-MCNC: 1.07 MG/DL (ref 0.5–1.3)
EGFRCR SERPLBLD CKD-EPI 2021: >90 ML/MIN/1.73M*2
EOSINOPHIL # BLD AUTO: 0 X10*3/UL (ref 0–0.7)
EOSINOPHIL NFR BLD AUTO: 0 %
ERYTHROCYTE [DISTWIDTH] IN BLOOD BY AUTOMATED COUNT: 12.9 % (ref 11.5–14.5)
GLUCOSE SERPL-MCNC: 87 MG/DL (ref 74–99)
GLUCOSE UR STRIP.AUTO-MCNC: NORMAL MG/DL
HCT VFR BLD AUTO: 43.6 % (ref 41–52)
HGB BLD-MCNC: 14.6 G/DL (ref 13.5–17.5)
IMM GRANULOCYTES # BLD AUTO: 0.02 X10*3/UL (ref 0–0.7)
IMM GRANULOCYTES NFR BLD AUTO: 0.3 % (ref 0–0.9)
KETONES UR STRIP.AUTO-MCNC: NEGATIVE MG/DL
LACTATE SERPL-SCNC: 1 MMOL/L (ref 0.4–2)
LEUKOCYTE ESTERASE UR QL STRIP.AUTO: NEGATIVE
LYMPHOCYTES # BLD AUTO: 0.46 X10*3/UL (ref 1.2–4.8)
LYMPHOCYTES NFR BLD AUTO: 8 %
MAGNESIUM SERPL-MCNC: 1.93 MG/DL (ref 1.6–2.4)
MCH RBC QN AUTO: 28.3 PG (ref 26–34)
MCHC RBC AUTO-ENTMCNC: 33.5 G/DL (ref 32–36)
MCV RBC AUTO: 85 FL (ref 80–100)
MONOCYTES # BLD AUTO: 0.69 X10*3/UL (ref 0.1–1)
MONOCYTES NFR BLD AUTO: 12.1 %
MUCOUS THREADS #/AREA URNS AUTO: NORMAL /LPF
NEUTROPHILS # BLD AUTO: 4.54 X10*3/UL (ref 1.2–7.7)
NEUTROPHILS NFR BLD AUTO: 79.4 %
NITRITE UR QL STRIP.AUTO: NEGATIVE
NRBC BLD-RTO: 0 /100 WBCS (ref 0–0)
PH UR STRIP.AUTO: 5.5 [PH]
PLATELET # BLD AUTO: 255 X10*3/UL (ref 150–450)
POTASSIUM SERPL-SCNC: 3.6 MMOL/L (ref 3.5–5.3)
PROT SERPL-MCNC: 7.3 G/DL (ref 6.4–8.2)
PROT UR STRIP.AUTO-MCNC: NEGATIVE MG/DL
RBC # BLD AUTO: 5.15 X10*6/UL (ref 4.5–5.9)
RBC # UR STRIP.AUTO: ABNORMAL MG/DL
RBC #/AREA URNS AUTO: NORMAL /HPF
SODIUM SERPL-SCNC: 137 MMOL/L (ref 136–145)
SP GR UR STRIP.AUTO: 1.02
UROBILINOGEN UR STRIP.AUTO-MCNC: NORMAL MG/DL
WBC # BLD AUTO: 5.7 X10*3/UL (ref 4.4–11.3)
WBC #/AREA URNS AUTO: NORMAL /HPF

## 2025-05-23 PROCEDURE — 87591 N.GONORRHOEAE DNA AMP PROB: CPT | Mod: PARLAB

## 2025-05-23 PROCEDURE — 96366 THER/PROPH/DIAG IV INF ADDON: CPT

## 2025-05-23 PROCEDURE — 83735 ASSAY OF MAGNESIUM: CPT

## 2025-05-23 PROCEDURE — 2500000004 HC RX 250 GENERAL PHARMACY W/ HCPCS (ALT 636 FOR OP/ED): Mod: JZ

## 2025-05-23 PROCEDURE — 36415 COLL VENOUS BLD VENIPUNCTURE: CPT

## 2025-05-23 PROCEDURE — 80053 COMPREHEN METABOLIC PANEL: CPT

## 2025-05-23 PROCEDURE — 99285 EMERGENCY DEPT VISIT HI MDM: CPT | Mod: 25

## 2025-05-23 PROCEDURE — 96372 THER/PROPH/DIAG INJ SC/IM: CPT

## 2025-05-23 PROCEDURE — 2500000001 HC RX 250 WO HCPCS SELF ADMINISTERED DRUGS (ALT 637 FOR MEDICARE OP)

## 2025-05-23 PROCEDURE — 96365 THER/PROPH/DIAG IV INF INIT: CPT

## 2025-05-23 PROCEDURE — 74177 CT ABD & PELVIS W/CONTRAST: CPT

## 2025-05-23 PROCEDURE — 85025 COMPLETE CBC W/AUTO DIFF WBC: CPT

## 2025-05-23 PROCEDURE — 87491 CHLMYD TRACH DNA AMP PROBE: CPT | Mod: PARLAB

## 2025-05-23 PROCEDURE — 81003 URINALYSIS AUTO W/O SCOPE: CPT

## 2025-05-23 PROCEDURE — 74177 CT ABD & PELVIS W/CONTRAST: CPT | Mod: FOREIGN READ | Performed by: RADIOLOGY

## 2025-05-23 PROCEDURE — 2550000001 HC RX 255 CONTRASTS

## 2025-05-23 PROCEDURE — 83605 ASSAY OF LACTIC ACID: CPT

## 2025-05-23 RX ORDER — CEFTRIAXONE 500 MG/1
500 INJECTION, POWDER, FOR SOLUTION INTRAMUSCULAR; INTRAVENOUS ONCE
Status: COMPLETED | OUTPATIENT
Start: 2025-05-23 | End: 2025-05-23

## 2025-05-23 RX ORDER — DOXYCYCLINE 100 MG/1
100 CAPSULE ORAL 2 TIMES DAILY
Qty: 60 CAPSULE | Refills: 0 | Status: SHIPPED | OUTPATIENT
Start: 2025-05-23 | End: 2025-06-22

## 2025-05-23 RX ORDER — ACETAMINOPHEN 325 MG/1
975 TABLET ORAL ONCE
Status: COMPLETED | OUTPATIENT
Start: 2025-05-23 | End: 2025-05-23

## 2025-05-23 RX ADMIN — ACETAMINOPHEN 975 MG: 325 TABLET ORAL at 11:41

## 2025-05-23 RX ADMIN — PIPERACILLIN SODIUM AND TAZOBACTAM SODIUM 3.38 G: 3; .375 INJECTION, SOLUTION INTRAVENOUS at 13:51

## 2025-05-23 RX ADMIN — CEFTRIAXONE SODIUM 500 MG: 500 INJECTION, POWDER, FOR SOLUTION INTRAMUSCULAR; INTRAVENOUS at 15:28

## 2025-05-23 RX ADMIN — IOHEXOL 75 ML: 350 INJECTION, SOLUTION INTRAVENOUS at 13:45

## 2025-05-23 ASSESSMENT — COLUMBIA-SUICIDE SEVERITY RATING SCALE - C-SSRS
1. IN THE PAST MONTH, HAVE YOU WISHED YOU WERE DEAD OR WISHED YOU COULD GO TO SLEEP AND NOT WAKE UP?: NO
2. HAVE YOU ACTUALLY HAD ANY THOUGHTS OF KILLING YOURSELF?: NO
6. HAVE YOU EVER DONE ANYTHING, STARTED TO DO ANYTHING, OR PREPARED TO DO ANYTHING TO END YOUR LIFE?: NO

## 2025-05-23 ASSESSMENT — PAIN DESCRIPTION - PAIN TYPE: TYPE: ACUTE PAIN

## 2025-05-23 ASSESSMENT — PAIN SCALES - GENERAL: PAINLEVEL_OUTOF10: 7

## 2025-05-23 ASSESSMENT — PAIN DESCRIPTION - LOCATION: LOCATION: RECTUM

## 2025-05-23 ASSESSMENT — PAIN - FUNCTIONAL ASSESSMENT: PAIN_FUNCTIONAL_ASSESSMENT: 0-10

## 2025-05-23 ASSESSMENT — PAIN DESCRIPTION - DESCRIPTORS: DESCRIPTORS: ACHING;SHARP

## 2025-05-23 NOTE — ED PROVIDER NOTES
HPI   Chief Complaint   Patient presents with    Fever     PT. STATES STARTED WITH FEVER TWO DAYS AGO. DENIES COLD/FLU SYMPTOMS. PT. STATES CONCERNED HE MAY HAVE A HEMORRHOID BECAUSE HAVING PAIN TO RECTUM STARTING TWO DAYS AGO ALSO. NO KNOWN HEMORRHOIDS IN PAST. PT. STATES PAIN TO RECTUM WITH COUGHING, WALKING, ETC., USING PREPARATION H OTC. PT. STATES TOOK TYLENOL AND MOTRIN AROUND 4 AM TODAY.          34-year-old male presenting to the emergency department from home due to rectal pain. Patient reports increasing rectal pain over the past 2 days. Pain is constant but exacerbated with cough, walking, etc. Denies any history of hemorrhoids but has been using OTC preparation H due to the pain. Additionally reports occasional fever and chills. States he does partake is anal intercourse with men but uses protection. Denies any history of STI/STD/HIV. No reported lightheadedness, dizziness, cough/cold symptoms, shortness of breath, chest pain, abdominal pain, nausea, vomiting, diarrhea, constipation, hematochezia, melena, anal or penile discharge, dysuria, hematuria, urinary urgency or frequency.               Patient History   Medical History[1]  Surgical History[2]  Family History[3]  Social History[4]    Physical Exam   ED Triage Vitals [05/23/25 1017]   Temperature Heart Rate Respirations BP   (!) 38.2 °C (100.8 °F) (!) 109 16 146/89      Pulse Ox Temp Source Heart Rate Source Patient Position   99 % Temporal Monitor Sitting      BP Location FiO2 (%)     Left arm --       Physical Exam  Vitals and nursing note reviewed. Exam conducted with a chaperone present.   Constitutional:       General: He is not in acute distress.     Appearance: He is obese. He is not ill-appearing or toxic-appearing.   HENT:      Head: Atraumatic.      Nose: Nose normal.      Mouth/Throat:      Mouth: Mucous membranes are moist.   Eyes:      Extraocular Movements: Extraocular movements intact.      Conjunctiva/sclera: Conjunctivae normal.    Cardiovascular:      Rate and Rhythm: Regular rhythm. Tachycardia present.   Pulmonary:      Effort: Pulmonary effort is normal.      Breath sounds: Normal breath sounds.   Abdominal:      General: Bowel sounds are normal. There is no distension.      Palpations: Abdomen is soft.      Tenderness: There is no abdominal tenderness. There is no right CVA tenderness, left CVA tenderness, guarding or rebound.   Genitourinary:     Rectum: Guaiac result negative. Tenderness present. No mass, anal fissure, external hemorrhoid or internal hemorrhoid. Normal anal tone.   Musculoskeletal:         General: Normal range of motion.      Cervical back: Normal range of motion and neck supple. No rigidity.   Skin:     General: Skin is warm and dry.      Capillary Refill: Capillary refill takes less than 2 seconds.      Findings: No rash.   Neurological:      Mental Status: He is alert and oriented to person, place, and time.   Psychiatric:         Mood and Affect: Mood normal.           ED Course & MDM   Diagnoses as of 05/23/25 0297   Rectal pain   Fever, unspecified fever cause                 No data recorded     [place] Coma Scale Score: 15 (05/23/25 1018 : Alexandra Olmstead RN)                           Medical Decision Making  34-year-old male presenting to the emergency department from home due to rectal pain.  Vital signs reviewed and stable.  He is slightly tachycardic up to 109 bpm and febrile at 100.8 F on arrival.  Patient is overall well-appearing and not in any acute distress.  He is anxious on exam.  He is not ill or toxic appearing.  His abdomen is soft, nontender, with equal bowel sounds throughout.  No guarding or rebound.  There is significant tenderness during both external and internal rectal exam which was performed with a chaperone present after obtaining verbal consent.  No palpable external or internal hemorrhoids.  No anal fissures.  No bleeding or discharge.  Rectal gonorrhea and chlamydia swabs were  obtained. Tylenol administered for pain and patient covered empirically with Zosyn for concern of underlying anorectal abscess.  He states despite lab and imaging findings he cannot stay for admission if necessary as he has no one to  his son from school this afternoon.  CBC without leukocytosis or anemia.  CMP and Mag unremarkable.  UA without indication of acute UTI.  Lactate 1.0.  CT abdomen and pelvis with IV contrast without evidence of acute process, remarkable for diverticulosis without diverticulitis.  Based on history, physical, lab and imaging findings patient is likely experiencing proctitis.  He was informed of all lab and imaging findings, all questions and concerns were answered.  Advised that the results of the gonorrhea and chlamydia swabs will not result for 24 to 48 hours.  He elected to be treated empirically prior to discharge.  IM Rocephin administered and he was discharged with the 100 mg doxycycline twice daily.  Advised he should at least take the antibiotics for 2 weeks but it may take up to 4 weeks for complete resolution.  Instructed to follow-up within the next 2 to 3 days with his primary care physician regarding today's emergency department visit.  We discussed strict return precautions to return to the emergency department with any new or worsening symptoms.  Patient was agreeable to plan of care and discharged in stable condition.        Procedure  Procedures       [1]   Past Medical History:  Diagnosis Date    GERD (gastroesophageal reflux disease)     Hypertension    [2]   Past Surgical History:  Procedure Laterality Date    OTHER SURGICAL HISTORY  03/23/2016    Skin Tag Removal   [3] No family history on file.  [4]   Social History  Tobacco Use    Smoking status: Never    Smokeless tobacco: Never    Tobacco comments:     PT. VAPES   Vaping Use    Vaping status: Every Day    Substances: Nicotine   Substance Use Topics    Alcohol use: Yes     Comment: OCCASSIONAL    Drug use:  Not Currently        Joycelyn Mchugh PA-C  05/23/25 5650

## 2025-05-23 NOTE — ED TRIAGE NOTES
PT. STATES STARTED WITH FEVER TWO DAYS AGO. DENIES COLD/FLU SYMPTOMS. PT. STATES CONCERNED HE MAY HAVE A HEMORRHOID BECAUSE HAVING PAIN TO RECTUM STARTING TWO DAYS AGO ALSO. NO KNOWN HEMORRHOIDS IN PAST. PT. STATES PAIN TO RECTUM WITH COUGHING, WALKING, ETC., USING PREPARATION H OTC. PT. STATES TOOK TYLENOL AND MOTRIN AROUND 4 AM TODAY.

## 2025-05-23 NOTE — DISCHARGE INSTRUCTIONS
You should rotate Tylenol and ibuprofen as needed at home for pain and fevers.  Prescribed doxycycline twice daily for 4 weeks.  Recommend following up with your primary care physician within 2 to 3 days regarding today's emergency department visit.  Return to the emergency department with any new or worsening symptoms including worsening pain, lightheadedness/dizziness, fevers, chills, severe nausea and vomiting, inability to tolerate food and fluids, shortness of breath, or chest pain.

## 2025-05-24 LAB
C TRACH RRNA SPEC QL NAA+PROBE: NEGATIVE
HOLD SPECIMEN: NORMAL
N GONORRHOEA DNA SPEC QL PROBE+SIG AMP: POSITIVE